# Patient Record
Sex: MALE | Race: WHITE
[De-identification: names, ages, dates, MRNs, and addresses within clinical notes are randomized per-mention and may not be internally consistent; named-entity substitution may affect disease eponyms.]

---

## 2020-01-25 ENCOUNTER — HOSPITAL ENCOUNTER (EMERGENCY)
Dept: HOSPITAL 74 - FER | Age: 25
Discharge: HOME | End: 2020-01-25
Payer: COMMERCIAL

## 2020-01-25 VITALS — DIASTOLIC BLOOD PRESSURE: 111 MMHG | TEMPERATURE: 98.7 F | HEART RATE: 92 BPM | SYSTOLIC BLOOD PRESSURE: 159 MMHG

## 2020-01-25 VITALS — BODY MASS INDEX: 25.7 KG/M2

## 2020-01-25 DIAGNOSIS — I10: Primary | ICD-10-CM

## 2020-01-25 NOTE — PDOC
Documentation entered by Lacy Hill SCRIBE, acting as scribe for 

Dc Leyva MD.








Dc Leyva MD:  This documentation has been prepared by the 

Liz lpata Xhesika, SCRIBE, under my direction and personally reviewed by 

me in its entirety.  I confirm that the documentation accurately reflects all 

work, treatment, procedures, and medical decision making performed by me.  





History of Present Illness





- General


Chief Complaint: Blood Pressure Problem


Stated Complaint: HTN


Time Seen by Provider: 01/25/20 19:11


History Source: Patient


Exam Limitations: No Limitations





- History of Present Illness


Initial Comments: 





01/25/20 19:29


The patient is a 25 y/o male with no PMH who presents to the ED with 

hypertension. The pt states his BP was 160/110 PTA. Pt states that last time he 

had HTN was back in august 2019. Pt reports daily heart burn associated with 

sweats, progressively getting worse. Pt states his heart burn is relieved after 

taking tums, however, returns after a couple of hours.


Patient denies any family cardiac disease.





PAST SURGICAL HISTORY:  no significant history





FAMILY HISTORY:  no pertinent history





SOCIAL HISTORY:  Pt lives with  family and is employed.





MEDICATIONS:  reviewed





ALLERGIES:  As per nursing notes





  


01/25/20 19:40


Assessment and plan: This is a 24-year-old male who comes in complaining of 

hypertension.  Patient has had hypertension since August of last year.  Patient 

went to the urgent care center today and he was told he needed to come to the 

ED because his blood pressure was high.  Patient does not have a primary care 

doctor and has not followed up with anybody regarding his hypertension.  

Patient said that his blood pressure last August was similar to what it is 

today and it has been unchanged over the last 5 to 6 months.


Patient did complain of some heartburn which he takes Tums for that do help but 

also has not had that worked up or evaluated by primary care doctor.





Patient had a cardiogram that was done that showed a heart rate at 99 with no 

acute ST-T wave changes and a normal EKG





Patient will be started on losartan and H2 blocker and referred to her primary 

care doctor.








Past History





- Past Medical History


Allergies/Adverse Reactions: 


 Allergies











Allergy/AdvReac Type Severity Reaction Status Date / Time


 


Penicillins Allergy   Verified 01/25/20 19:06











Home Medications: 


Ambulatory Orders





Losartan Potassium [Cozaar -] 50 mg PO DAILY #30 tablet 01/25/20 


Omeprazole/Sodium Bicarbonate [Omeprazole-Bicarb 20-1,100 Cap] 1 each PO DAILY #

14 capsule 01/25/20 








COPD: No





- Immunization History


Immunization Up to Date: Yes





- Psycho Social/Smoking Cessation Hx


Smoking Status: No


Smoking History: Never smoked


Have you smoked in the past 12 months: No


Number of Cigarettes Smoked Daily: 0


Information on smoking cessation initiated: No


'Breaking Loose' booklet given: 08/03/16


Hx Alcohol Use: No


Drug/Substance Use Hx: No


Substance Use Type: None





**Review of Systems





- Review of Systems


Able to Perform ROS?: Yes


Comments:: 





01/25/20 19:32


General:  No fevers or chills, no weakness, no weight loss. +elevated BP. +

sweats 


HEENT: No change in vision.  No sore throat,. No ear pain


CardioVascular:  No chest pain or shortness of breath. +heart burn 


Respiratory:No cough, or wheezing. 


Gastrointestinal:  no nausea, vomiting, diarrhea or constipation,  No rectal 

bleeding


Genitourinary:  No dysuria, hematuria, or frequency


Musculoskeletal:  No joint or muscle pain or swelling


Neurologic: No headache, vertigo, dizziness or loss of consciousness


Psychiatric: nor depression 


Skin: No rashes or easy bruising


Endocrine: no increased thirst or abnormal weight change


Allergic: no skin or latex allergy





*Physical Exam





- Vital Signs


 Last Vital Signs











Temp Pulse Resp BP Pulse Ox


 


 98.7 F   92 H  16   159/111 H  98 


 


 01/25/20 19:06  01/25/20 19:06  01/25/20 19:06  01/25/20 19:06  01/25/20 19:06














- Physical Exam





01/25/20 19:36


GENERAL: The patient is awake, alert, and fully oriented, in no acute distress.


HEAD: Normal with no signs of trauma.


EYES: Pupils equal, round and reactive to light, extraocular movements intact, 

sclera anicteric, conjunctiva clear.


EARS: +mild erythema of R ear. + Mild erythema of R posterior oropharynx. +

small non tender minimally enlarged lymph node of b/l submandibular. 


EXTREMITIES: Normal range of motion, no edema.


NEUROLOGICAL: Normal speech, normal gait.


PSYCH: Normal mood, normal affect.


SKIN: Warm, Dry, normal turgor, no rashes or lesions noted.








Discharge





- Discharge Information


Problems reviewed: Yes


Clinical Impression/Diagnosis: 


 Essential hypertension





Condition: Stable


Disposition: HOME





- Admission


No





- Follow up/Referral





- Patient Discharge Instructions


Patient Printed Discharge Instructions:  DI for High Blood Pressure


Additional Instructions: 


Get your prescription filled for the blood pressure medicine losartan as well 

as the acid reflux medication omeprazole and take 1 a day.





Follow-up with the primary care doctor on Monday give them a call and get an 

appointment.





Return to the emergency department immediately with ANY new, persistent or 

worsening symptoms.





Continue any medications as previously prescribed by your physician.





You should follow up with your primary doctor as soon as possible regarding 

today's emergency department visit.


.


Please make sure your doctor reviews the results of your emergency evaluation.





Thank you for coming to the   Emergency Department today for your care. It was 

a pleasure to see you today. Please note that your evaluation is INCOMPLETE 

until you  follow-up with your doctor. 





- Post Discharge Activity

## 2020-01-26 NOTE — EKG
Test Reason : 

Blood Pressure : ***/*** mmHG

Vent. Rate : 099 BPM     Atrial Rate : 099 BPM

   P-R Int : 142 ms          QRS Dur : 084 ms

    QT Int : 340 ms       P-R-T Axes : 062 075 027 degrees

   QTc Int : 436 ms

 

*** POOR DATA QUALITY, INTERPRETATION MAY BE ADVERSELY AFFECTED

NORMAL SINUS RHYTHM WITH SINUS ARRHYTHMIA

NORMAL ECG

NO PREVIOUS ECGS AVAILABLE

Confirmed by MARÍA KHAN MD ([id number]) on 1/26/2020 12:17:10 PM

 

Referred By:             Confirmed By:MARÍA KHAN MD